# Patient Record
Sex: MALE | Race: WHITE | NOT HISPANIC OR LATINO | Employment: OTHER | URBAN - METROPOLITAN AREA
[De-identification: names, ages, dates, MRNs, and addresses within clinical notes are randomized per-mention and may not be internally consistent; named-entity substitution may affect disease eponyms.]

---

## 2020-06-30 ENCOUNTER — HOSPITAL ENCOUNTER (OUTPATIENT)
Dept: RADIOLOGY | Facility: HOSPITAL | Age: 65
Discharge: HOME | End: 2020-06-30
Attending: PODIATRIST
Payer: MEDICARE

## 2020-06-30 ENCOUNTER — OFFICE VISIT (OUTPATIENT)
Dept: PODIATRY | Facility: CLINIC | Age: 65
End: 2020-06-30
Payer: MEDICARE

## 2020-06-30 VITALS
BODY MASS INDEX: 28.72 KG/M2 | DIASTOLIC BLOOD PRESSURE: 88 MMHG | HEIGHT: 67 IN | HEART RATE: 60 BPM | SYSTOLIC BLOOD PRESSURE: 155 MMHG | WEIGHT: 183 LBS

## 2020-06-30 DIAGNOSIS — M21.612 BUNION, LEFT: ICD-10-CM

## 2020-06-30 DIAGNOSIS — M79.672 FOOT PAIN, LEFT: ICD-10-CM

## 2020-06-30 DIAGNOSIS — M19.079 ARTHRITIS OF METATARSOPHALANGEAL (MTP) JOINT OF GREAT TOE: Primary | ICD-10-CM

## 2020-06-30 PROCEDURE — 3077F PR MOST RECENT SYSTOLIC BLOOD PRESSURE >= 140 MM HG: ICD-10-PCS | Mod: CPTII,S$GLB,, | Performed by: PODIATRIST

## 2020-06-30 PROCEDURE — 3008F PR BODY MASS INDEX (BMI) DOCUMENTED: ICD-10-PCS | Mod: CPTII,S$GLB,, | Performed by: PODIATRIST

## 2020-06-30 PROCEDURE — 99203 PR OFFICE/OUTPT VISIT, NEW, LEVL III, 30-44 MIN: ICD-10-PCS | Mod: S$GLB,,, | Performed by: PODIATRIST

## 2020-06-30 PROCEDURE — 73630 X-RAY EXAM OF FOOT: CPT | Mod: TC,PO,LT

## 2020-06-30 PROCEDURE — 99999 PR PBB SHADOW E&M-EST. PATIENT-LVL IV: CPT | Mod: PBBFAC,,, | Performed by: PODIATRIST

## 2020-06-30 PROCEDURE — 99999 PR PBB SHADOW E&M-EST. PATIENT-LVL IV: ICD-10-PCS | Mod: PBBFAC,,, | Performed by: PODIATRIST

## 2020-06-30 PROCEDURE — 1101F PT FALLS ASSESS-DOCD LE1/YR: CPT | Mod: CPTII,S$GLB,, | Performed by: PODIATRIST

## 2020-06-30 PROCEDURE — 3008F BODY MASS INDEX DOCD: CPT | Mod: CPTII,S$GLB,, | Performed by: PODIATRIST

## 2020-06-30 PROCEDURE — 73630 X-RAY EXAM OF FOOT: CPT | Mod: 26,LT,, | Performed by: RADIOLOGY

## 2020-06-30 PROCEDURE — 1101F PR PT FALLS ASSESS DOC 0-1 FALLS W/OUT INJ PAST YR: ICD-10-PCS | Mod: CPTII,S$GLB,, | Performed by: PODIATRIST

## 2020-06-30 PROCEDURE — 3079F DIAST BP 80-89 MM HG: CPT | Mod: CPTII,S$GLB,, | Performed by: PODIATRIST

## 2020-06-30 PROCEDURE — 99203 OFFICE O/P NEW LOW 30 MIN: CPT | Mod: S$GLB,,, | Performed by: PODIATRIST

## 2020-06-30 PROCEDURE — 73630 XR FOOT COMPLETE 3 VIEW LEFT: ICD-10-PCS | Mod: 26,LT,, | Performed by: RADIOLOGY

## 2020-06-30 PROCEDURE — 3079F PR MOST RECENT DIASTOLIC BLOOD PRESSURE 80-89 MM HG: ICD-10-PCS | Mod: CPTII,S$GLB,, | Performed by: PODIATRIST

## 2020-06-30 PROCEDURE — 3077F SYST BP >= 140 MM HG: CPT | Mod: CPTII,S$GLB,, | Performed by: PODIATRIST

## 2020-06-30 NOTE — PATIENT INSTRUCTIONS
Shoes: Altra    Recommend icing and applying voltaren cream to the affected joint as needed for pain.    Would consider a steroid injection in the joint only if it begins to affect your activities of daily living.

## 2020-06-30 NOTE — LETTER
June 30, 2020      Vishal Meza MD  [address]  [facility] [address]           [facility] [address] [place] [address]  Phone: [phone]          Patient: Johnny Gaitan   MR Number: [id number]   YOB: 1955   Date of Visit: 6/30/2020       Dear Dr. Vishal Meza:    Thank you for referring Johnny Gaitan to me for evaluation. Attached you will find relevant portions of my assessment and plan of care.    If you have questions, please do not hesitate to call me. I look forward to following Johnny Gaitan along with you.    Sincerely,    Ramy Mir, PIOTR    Enclosure  CC:  No Recipients    If you would like to receive this communication electronically, please contact [email or web address] or [phone] to request more information on [facility] Link access.    For providers and/or their staff who would like to refer a patient to [facility], please contact us through our one-stop-shop provider referral line, [facility], at [phone].    If you feel you have received this communication in error or would no longer like to receive these types of communications, please e-mail [email or web address]

## 2020-07-01 NOTE — PROGRESS NOTES
Subjective:      Patient ID: Johnny Gaitan is a 64 y.o. male.    Chief Complaint: Foot Problem (bunion left foot )  Patient presents to clinic with the chief complaint of a newly developed bunion that has been present for the past year.  States this has developed on the Lt. Foot.  Describes aching pain within the joint with daily activity.  Currently rates pain from the joint as a 0/10.  Symptoms are present with weight bearing and pressure only.  Symptoms are alleviated with rest.  Notes obtaining relief with use of the current shoe and with applying a topical analgesic.  Inquires as to additional treatment options to slow progression of the bunion.  Denies any additional pedal complaints.      Past Medical History:   Diagnosis Date    a Aortic Regurgitation ###    Dr. Jose Eduardo Peters; 10/14/14 Echo = Mildly Depressed LVSF With EF 50%; Mild-To-Moderate AR; And Mild MR    a CAD With H/O 6 Vessel CABG In 2013     Dr. Jose Eduardo Peters; Dr. Jabier Ferguson    a Chronic Systolic CHF ###    9/26/16 RXd Losartan 25 Mg Daily With BMP In 2 Weeks; Dr. Jose Eduardo Peters; 10/14/14 Echo = Mildly Depressed LVSF With EF 50%; Mild-To-Moderate AR; And Mild MR    a H/O Paroxysmal Atrial Fibrillation     Dr. Jose Eduardo Peters; On Coreg 12.5 Mg 1/2 Tab qAM And 1 Tab qHS, And  Mg Daily; He Is Not On Anticoagulation    b Hypertension     9/26/16 RXd Losartan 25 Mg Daily With BMP In 2 Weeks; On Coreg 12.5 Mg 1/2 Tab qAM And 1 Tab qHS    c Hypercholesterolemia     12/7/17 RXd Crestor 20 Mg With Labs In 6 Weeks; Lipitor 40 Mg Caused Rhinorrhea And Diarrhea; On OTC CoEnzyme Q-10 100 Mg Daily    e Low FT4 With Normal TSH 5/15/19     Will Monitor    g Mild Leukopenia 5/15/19     Will Monitor    i Daytime Drowsiness ###    5/9/19 Referred To Dr. Agata Gilliam    j GERD And H. Pylori Negative Gastritis     Dr. Pieter Carbajal; 2/4/16 EGD = GERD And H. Pylori Negative Gastritis    j H/O Diminutive Rectal Polyp On 2/4/16 TC ###    Dr. Pieter Carbajal:  ""Repeat TC In 7 YRs"    j Sigmoid Diverticulosis     Dr. Pieter ingram H/O Left Knee Medial Meniscal Tear     3/27/13 Left Knee MRI = Left Medial Meniscal Tear    l Left Foot Bunion     6/24/20 Referred To Dr. Ramy ingram Left Knee Osteoarthritis     7/12/12 Left Knee XRays = Mild OA Changes    l Plantar fasciitis     m Borderline Vitamin B12 Deficiency ###    6/2/19 RXd OTC Vitamin B12 2K Mcg PO Daily    m Chronic Fatigue ###    5/15/19 Testosterone = Normal    m Remote H/O Transient Global Amnesia ###    Occurred In His 40s While Under Increased Stress    n Anxiety Disorder     On Celexa 20 Mg Daily (Though This Only Has FDA Approval For Depression)    n Attention Deficit Hyperactivity Disorder ###    Dr. Alessandro Bowens (Who Moved To [place]) Was TXing With Adderall; DXd In His 40s    o Allergic Rhinosinusitis     q H/O Nonmelanomatous Skin SCCA     Dr. Gigi Wilson    Wellness Visit 6/24/2020        Past Surgical History:   Procedure Laterality Date    a piece of grass removed from throat  1972    COLONOSCOPY      coronary bypass surgery'  12/0/2013    heart bypass      steroid injection  08/2018       Family History   Problem Relation Age of Onset    Hypertension Father     Hypertension Brother        Social History     Socioeconomic History    Marital status:      Spouse name: Not on file    Number of children: Not on file    Years of education: Not on file    Highest education level: Not on file   Occupational History    Not on file   Social Needs    Financial resource strain: Not on file    Food insecurity     Worry: Not on file     Inability: Not on file    Transportation needs     Medical: Not on file     Non-medical: Not on file   Tobacco Use    Smoking status: Former Smoker    Smokeless tobacco: Never Used   Substance and Sexual Activity    Alcohol use: Yes     Alcohol/week: 0.0 standard drinks    Drug use: No    Sexual activity: Not on file "   Lifestyle    Physical activity     Days per week: Not on file     Minutes per session: Not on file    Stress: Not on file   Relationships    Social connections     Talks on phone: Not on file     Gets together: Not on file     Attends Shinto service: Not on file     Active member of club or organization: Not on file     Attends meetings of clubs or organizations: Not on file     Relationship status: Not on file   Other Topics Concern    Not on file   Social History Narrative    Not on file       Current Outpatient Medications   Medication Sig Dispense Refill    aspirin 325 MG tablet Take 1 tablet by mouth once daily.      carvediloL (COREG) 12.5 MG tablet TAKE 1/2 TABLET BY MOUTH EVERY MORNING AND TAKE 1 TABLET AT BEDTIME 135 tablet 3    citalopram (CELEXA) 20 MG tablet TAKE 1 TABLET BY MOUTH EVERY DAY 90 tablet 3    cyanocobalamin (VITAMIN B-12) 2000 MCG tablet Take 1 tablet (2,000 mcg total) by mouth once daily.      glucosamine-chondroitin 500-400 mg tablet Take 1 tablet by mouth once daily.      losartan (COZAAR) 100 MG tablet Take 1 tablet (100 mg total) by mouth once daily. 90 tablet 3    rosuvastatin (CRESTOR) 20 MG tablet Take 1 tablet (20 mg total) by mouth every evening. 90 tablet 3     No current facility-administered medications for this visit.        Review of patient's allergies indicates:   Allergen Reactions    Penicillins        Review of Systems   Constitution: Negative for chills and fever.   Skin: Negative for color change and nail changes.   Musculoskeletal: Positive for arthritis and joint pain. Negative for muscle cramps, muscle weakness and myalgias.   Gastrointestinal: Negative for nausea and vomiting.   Neurological: Negative for numbness and paresthesias.   Psychiatric/Behavioral: Negative for altered mental status.           Objective:      Physical Exam  Constitutional:       Appearance: Normal appearance. He is not ill-appearing.   Cardiovascular:      Pulses:            Dorsalis pedis pulses are 2+ on the right side and 2+ on the left side.        Posterior tibial pulses are 2+ on the right side and 2+ on the left side.      Comments: CFT is < 3 seconds bilateral.  Pedal hair growth is decreased bilateral.  Varicosities noted bilateral.  No lower extremity edema noted bilateral.  Toes are warm to touch bilateral.    Musculoskeletal:         General: Tenderness present. No deformity or signs of injury.      Right lower leg: No edema.      Left lower leg: No edema.      Comments: Muscle strength 5/5 in all muscle groups bilateral.  Limited dorsiflexion noted to the Lt. 1st mtp joint on account of dorsal spurring and arthritic changes to the joint.  Slight crepitation and mild pain with active and passive ROM.  Mild pain with palpation to the Lt. Medial eminence.  Mild HAV noted to the Rt. Foot.  Bilateral tailors bunion.   Skin:     General: Skin is warm and dry.      Capillary Refill: Capillary refill takes 2 to 3 seconds.      Findings: No bruising, ecchymosis, erythema, signs of injury, laceration, lesion, petechiae, rash or wound.      Comments: Pedal skin has normal turgor, temperature, and texture bilateral.  Toenails x 10 appear normotrophic. Examination of the skin reveals no evidence of significant maceration, rashes, open lesions, suspicious appearing nevi or other concerning lesions.    Neurological:      General: No focal deficit present.      Mental Status: He is alert.      Sensory: No sensory deficit.      Motor: Motor function is intact.      Gait: Gait is intact.      Comments: Light touch intact bilateral.                 Assessment:       Encounter Diagnoses   Name Primary?   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    - Discussed conservative vs surgical treatment of Lt. Sided bunion deformity.    - Recommend wearing supportive shoes with a wider toe box to accommodate digital deformities.  Discussed avoidance of barefoot walking, flip flops, and Crocs, as this will exacerbate current symptoms.       - Recommend icing the affected area a minimum of 20 minutes daily.    - May consider applying a topical analgesic (voltaren cream) to help with pain symptoms.       - RTC prn or sooner if symptoms worsen.  Will consider corticosteroid injection if symptoms begin to limit his ADLs.       Ramy Mir DPM

## 2021-05-10 ENCOUNTER — PATIENT MESSAGE (OUTPATIENT)
Dept: RESEARCH | Facility: HOSPITAL | Age: 66
End: 2021-05-10

## 2022-07-13 PROBLEM — Z86.16 HISTORY OF 2019 NOVEL CORONAVIRUS DISEASE (COVID-19): Status: ACTIVE | Noted: 2022-07-13

## 2022-07-14 PROBLEM — Z95.1 HISTORY OF CORONARY ARTERY BYPASS GRAFT X 6: Status: ACTIVE | Noted: 2022-07-14

## 2023-07-06 PROBLEM — Z86.010 HISTORY OF COLON POLYPS: Status: ACTIVE | Noted: 2023-07-06

## 2023-07-06 PROBLEM — Z86.010 HISTORY OF COLON POLYPS: Status: RESOLVED | Noted: 2023-07-06 | Resolved: 2023-07-06

## 2023-08-08 PROBLEM — I65.23 BILATERAL CAROTID ARTERY STENOSIS: Status: ACTIVE | Noted: 2023-08-08

## 2023-09-03 PROBLEM — I50.22 CHRONIC SYSTOLIC HEART FAILURE: Status: ACTIVE | Noted: 2023-09-03

## 2023-09-03 PROBLEM — D50.9 MICROCYTIC ANEMIA: Status: ACTIVE | Noted: 2023-09-03

## 2023-09-03 PROBLEM — E78.00 HYPERCHOLESTEROLEMIA: Status: ACTIVE | Noted: 2023-09-03

## 2023-09-03 PROBLEM — E55.9 VITAMIN D DEFICIENCY, UNSPECIFIED: Status: ACTIVE | Noted: 2023-09-03
